# Patient Record
Sex: FEMALE | Race: WHITE | URBAN - METROPOLITAN AREA
[De-identification: names, ages, dates, MRNs, and addresses within clinical notes are randomized per-mention and may not be internally consistent; named-entity substitution may affect disease eponyms.]

---

## 2019-10-30 ENCOUNTER — COMMUNICATION - HEALTHEAST (OUTPATIENT)
Dept: ADMINISTRATIVE | Facility: CLINIC | Age: 54
End: 2019-10-30

## 2019-11-04 ENCOUNTER — AMBULATORY - HEALTHEAST (OUTPATIENT)
Dept: LAB | Facility: CLINIC | Age: 54
End: 2019-11-04

## 2019-11-04 ENCOUNTER — OFFICE VISIT - HEALTHEAST (OUTPATIENT)
Dept: INFECTIOUS DISEASES | Facility: CLINIC | Age: 54
End: 2019-11-04

## 2019-11-04 DIAGNOSIS — M86.271 SUBACUTE OSTEOMYELITIS OF RIGHT FOOT (H): ICD-10-CM

## 2019-11-04 LAB — C REACTIVE PROTEIN LHE: 0.4 MG/DL (ref 0–0.8)

## 2019-11-04 RX ORDER — DOXYCYCLINE 100 MG/1
CAPSULE ORAL
Refills: 0 | Status: SHIPPED | COMMUNITY
Start: 2019-10-21

## 2019-11-04 RX ORDER — CEFDINIR 300 MG/1
CAPSULE ORAL
Refills: 0 | Status: SHIPPED | COMMUNITY
Start: 2019-10-21

## 2019-11-08 ENCOUNTER — COMMUNICATION - HEALTHEAST (OUTPATIENT)
Dept: TELEHEALTH | Facility: CLINIC | Age: 54
End: 2019-11-08

## 2019-11-08 ENCOUNTER — HOSPITAL ENCOUNTER (OUTPATIENT)
Dept: MRI IMAGING | Facility: HOSPITAL | Age: 54
Discharge: HOME | End: 2019-11-08

## 2019-11-08 DIAGNOSIS — M86.271 SUBACUTE OSTEOMYELITIS OF RIGHT FOOT (H): ICD-10-CM

## 2019-11-15 ENCOUNTER — HOSPITAL ENCOUNTER (OUTPATIENT)
Dept: MRI IMAGING | Facility: HOSPITAL | Age: 54
Discharge: HOME | End: 2019-11-15

## 2019-11-18 ENCOUNTER — COMMUNICATION - HEALTHEAST (OUTPATIENT)
Dept: INFECTIOUS DISEASES | Facility: CLINIC | Age: 54
End: 2019-11-18

## 2021-05-28 ENCOUNTER — RECORDS - HEALTHEAST (OUTPATIENT)
Dept: ADMINISTRATIVE | Facility: CLINIC | Age: 56
End: 2021-05-28

## 2021-05-29 ENCOUNTER — RECORDS - HEALTHEAST (OUTPATIENT)
Dept: ADMINISTRATIVE | Facility: CLINIC | Age: 56
End: 2021-05-29

## 2021-06-01 ENCOUNTER — RECORDS - HEALTHEAST (OUTPATIENT)
Dept: ADMINISTRATIVE | Facility: CLINIC | Age: 56
End: 2021-06-01

## 2021-06-02 ENCOUNTER — RECORDS - HEALTHEAST (OUTPATIENT)
Dept: ADMINISTRATIVE | Facility: CLINIC | Age: 56
End: 2021-06-02

## 2021-06-02 NOTE — TELEPHONE ENCOUNTER
I called the pt.  She did not take nor fill the DC abx planned due to no insurance.  Now has insurance and on them, but sick to stomack.  Told to soldier on best she can, or come to ER if worse.    Dr Rodriguez

## 2021-06-02 NOTE — TELEPHONE ENCOUNTER
I called the pt, she states that she stopped the abx for 2 days d/t her nausea, however has restarted the abx. The pt was to be on the cefdinir and doxy x 10 days, however she did not start the abx until 10/25. The pt states that her foot was red prior to starting the abx, however she feels the redness is worse. The pt also states that she has tingling in her toes. The pt denies warmth or drainage at the site. The pt is afebrile and denies chills. I informed I will speak to the MD about an abx change and contact her back. She will continue the abx for now.

## 2021-06-02 NOTE — TELEPHONE ENCOUNTER
The antibiotic gave her bad stomach pain so she stopped it for two days.  Now her foot is red again.

## 2021-06-03 VITALS
WEIGHT: 127 LBS | BODY MASS INDEX: 21.13 KG/M2 | HEART RATE: 80 BPM | DIASTOLIC BLOOD PRESSURE: 76 MMHG | TEMPERATURE: 97.6 F | SYSTOLIC BLOOD PRESSURE: 134 MMHG

## 2021-06-03 NOTE — PROGRESS NOTES
Assessment:      Impression: Recurrent inflammation right foot.  Symptoms sound more like a tenosynovitis.    When she was discharged from the hospital, she did not have any insurance and did not take any of her antibiotics that were prescribed at discharge.    She now complains of increasing pain in discomfort in the right foot after starting the antibiotics.    She also complains of severe GI upset with the Cefdinir and doxycycline.       Plan:     Orders Placed This Encounter   Procedures     MR Forefoot With Without Contrast Right     C-reactive protein        We will see what the blood tests show.  In the meantime, I have asked her to stop the doxycycline but continue the Cefdinir as I think is more likely to be causing her stomach upset.  She may require IV antibiotics if there is more conclusive evidence of osteomyelitis.     Subjective:      This is an follow-up infectious Disease visit for Nesha Gonzalez, who is a 54 y.o.  referred for evaluation of right foot infection.     Patient is a 54-year-old woman I saw at [facility] when she was admitted with right foot inflammation.    At that time, she pretty clearly had cellulitis and symptoms of a hallucis longus tendinopathy or tendinitis.    Imaging tests were suspicious but not confirmatory of osteomyelitis at the distal phalanx of the right great toe.  CRP was normal.    Because of the normal CRP and the non-confirmatory MRI scan, I did not elect to treat as osteomyelitis.  Instead I recommended discharge on oral antibiotics with Cefdinir doxycycline.  We did not have any actual positive cultures during the hospitalization.    Because of lack of insurance coverage, patient did not get the prescriptions filled for her antibiotics at the time of discharge.    She eventually did get those filled about a week ago.  She started taking the antibiotics and says she actually felt worse in the foot.  She also has severe stomach discomfort.    However, she  does say that prior to starting the antibiotic she did notice some increasing redness in the dorsum of the foot which improved with the antibiotics being administered.        The following portions of the patient's history were reviewed and updated as appropriate: allergies, current medications, past family history, past medical history, past social history, past surgical history and problem list.      Review of Systems  Performed and all negative except as mentioned above.      Objective:      /76 (Patient Site: Right Arm, Patient Position: Sitting, Cuff Size: Adult Regular)   Pulse 80   Temp 97.6  F (36.4  C)   Wt 127 lb (57.6 kg)   BMI 21.13 kg/m    General:   alert, appears stated age and cooperative   Oropharynx:  Oropharynx clear    Eyes:   Extraocular muscles intact, no icterus    Ears:   Deferred   Neck:  no adenopathy and supple, symmetrical, trachea midline   Thyroid:   Deferred   Lung:  Normal respiratory pattern   Heart:   Deferred   Abdomen:  Sitting at time of exam   Extremities:  Right foot she does have slight erythema induration of the right great toe.  She complains of some tenderness when she flexes and extends the toe, but she is able to do it without hesitation.  There is no purulent drainage on the inner aspect of the right foot.   Skin:  warm and dry, no hyperpigmentation, vitiligo, or suspicious lesions and Kept as described above in the right foot.   CVA:   Deferred   Genitourinary:  defer exam   Neurological:   Grossly normal   Psychiatric:   normal mood, behavior, speech, dress, and thought processes         Jose Bennett MD

## 2021-06-03 NOTE — TELEPHONE ENCOUNTER
Call patient to give her her MRI results.  No infection seen.  She does have some arthritis.  Her lab tests were good at the last visit.    She finished her Cefdinir and is feeling good.    We will cancel the appointment today.  She can follow-up as needed.